# Patient Record
Sex: FEMALE | Race: WHITE | URBAN - METROPOLITAN AREA
[De-identification: names, ages, dates, MRNs, and addresses within clinical notes are randomized per-mention and may not be internally consistent; named-entity substitution may affect disease eponyms.]

---

## 2018-09-05 ENCOUNTER — HOSPITAL ENCOUNTER (EMERGENCY)
Age: 23
Discharge: HOME | End: 2018-09-05
Attending: EMERGENCY MEDICINE
Payer: COMMERCIAL

## 2018-09-05 VITALS
HEART RATE: 90 BPM | OXYGEN SATURATION: 100 % | HEIGHT: 63 IN | DIASTOLIC BLOOD PRESSURE: 88 MMHG | BODY MASS INDEX: 34.55 KG/M2 | WEIGHT: 195 LBS | SYSTOLIC BLOOD PRESSURE: 108 MMHG | RESPIRATION RATE: 20 BRPM | TEMPERATURE: 98 F

## 2018-09-05 DIAGNOSIS — R82.5 POSITIVE URINE DRUG SCREEN: ICD-10-CM

## 2018-09-05 DIAGNOSIS — R45.851 SUICIDAL IDEATION: Primary | ICD-10-CM

## 2018-09-05 LAB
AMPHETAMINE SCREEN, URINE: ABNORMAL
BARBITURATE SCREEN URINE: ABNORMAL
BENZODIAZEPINE SCREEN, URINE: ABNORMAL
CANNABINOID SCREEN URINE: POSITIVE
COCAINE METABOLITE SCREEN URINE: ABNORMAL
Lab: ABNORMAL
METHADONE SCREEN, URINE: ABNORMAL
OPIATE SCREEN URINE: ABNORMAL
OXYCODONE URINE: ABNORMAL
PH UA: 6
PHENCYCLIDINE SCREEN URINE: ABNORMAL
PROPOXYPHENE SCREEN: ABNORMAL

## 2018-09-05 PROCEDURE — 80307 DRUG TEST PRSMV CHEM ANLYZR: CPT

## 2018-09-05 PROCEDURE — 99284 EMERGENCY DEPT VISIT MOD MDM: CPT

## 2018-09-05 ASSESSMENT — ENCOUNTER SYMPTOMS
SHORTNESS OF BREATH: 0
ABDOMINAL PAIN: 0

## 2018-09-05 NOTE — ED PROVIDER NOTES
SURGICAL HISTORY     History reviewed. No pertinent surgical history. CURRENT MEDICATIONS       There are no discharge medications for this patient. ALLERGIES     Patient has no known allergies. FAMILY HISTORY     History reviewed. No pertinent family history. SOCIAL HISTORY       Social History     Social History    Marital status: Single     Spouse name: N/A    Number of children: N/A    Years of education: N/A     Social History Main Topics    Smoking status: Never Smoker    Smokeless tobacco: Never Used    Alcohol use Yes      Comment: occ, over 1mth ago    Drug use: Yes     Types: Marijuana    Sexual activity: Yes     Partners: Male     Other Topics Concern    None     Social History Narrative    None       SCREENINGS             PHYSICAL EXAM    (up to 7 for level 4, 8 or more for level 5)     ED Triage Vitals [09/05/18 1305]   BP Temp Temp Source Pulse Resp SpO2 Height Weight   108/88 98 °F (36.7 °C) Oral 90 20 100 % 5' 3\" (1.6 m) 195 lb (88.5 kg)       Physical Exam   Constitutional: She is oriented to person, place, and time. She appears well-developed and well-nourished. HENT:   Head: Normocephalic and atraumatic. Neck: Normal range of motion. Cardiovascular: Normal rate. Pulmonary/Chest: Effort normal. No respiratory distress. Abdominal: Soft. She exhibits no distension. Musculoskeletal: Normal range of motion. Neurological: She is alert and oriented to person, place, and time. Skin: Skin is warm and dry. Psychiatric: She exhibits a depressed mood. She expresses suicidal ideation. Patient states she has been having increasing suicidal thoughts over the last year. States she made comments to her mother today that she wanted to hurt herself. Patient had a flat affect but is otherwise alert and oriented and answering questions appropriately.   Patient denies having a plan for suicide and states that she really just wants help       DIAGNOSTIC her to be discharged home. Psychiatry also reports this point the patient is refusing to have blood work drawn. Dr. Pedrito Aviles was notified that the patient is considered medically cleared. Patient did have positive drug screen. Psychiatry was informed that she was not medically cleared by myself Dr. ePdrito Aviles was notified and Dr. Pedrito Aviles discussed the case with psychiatry and was planning on discharging the patient. The patient tolerated their visit well. They were seen and evaluated by the attending physician, No att. providers found who agreed with the assessment and plan. The patient and / or the family were informed of the results of any tests, a time was given to answer questions, a plan was proposed and they agreed with plan. FINAL IMPRESSION      1. Suicidal ideation    2. Positive urine drug screen          DISPOSITION/PLAN   DISPOSITION Decision To Discharge 09/05/2018 07:13:35 PM      PATIENT REFERRED TO:  No follow-up provider specified. DISCHARGE MEDICATIONS:  There are no discharge medications for this patient. DISCONTINUED MEDICATIONS:  There are no discharge medications for this patient.              (Please note that portions of this note were completed with a voice recognition program.  Efforts were made to edit the dictations but occasionally words are mis-transcribed.)    DANIEL Cortes CNP (electronically signed)     DANIEL Cortes CNP  09/05/18 2030

## 2018-09-05 NOTE — ED PROVIDER NOTES
Attending Supervisory Note/Shared Visit   I have personally performed a face to face diagnostic evaluation on this patient. I have reviewed the mid-levels findings and agree. History and Exam by me shows: This is a 61-year-old female brought to emergency department for evaluation after our department with her mother patient reports being usual state of health until this last several months. Does report that she's had increasing anxiety and suicidal thoughts. Does report having history of having ADHD and had been on medication up until she was 20 after which she stopped taking it. She states that she's been feeling more anxious and depressed. She feels that she does need to be seen by a counselor or psychiatrist.  Patient states that she has been up appointment with her primary care physician for follow-up in the short time. She states that today her symptoms were exacerbated by an argument with her mother no longer feels suicidal or homicidal.    Physical exam upon arrival patient's afebrile vital signs noted above in general well-appearing well-nourished female in no acute distress chest regular rhythm no murmurs rubs or gallops abdomen soft nontender nondistended lungs clear to auscultation bilaterally back no cervical thoracic lumps or bony chest is CVA tenderness extremities no unilateral leg swelling or tenderness mind either one of her calves psychiatric patient denies any thoughts of hurting herself or anyone else at this time    Decision-making  This is a 61-year-old female brought to emergency room for evaluation after an appointment with her mother. Based on patient's history and physical there could be multiple causes of patient's symptoms. Would be concerned about possible drug or alcohol abuse.   Patient denying thoughts of hurting herself or he was at this time I do believe that she is a low risk for self injury injuries behavior in the near future    Clinical impression 1 acute stress reaction 2. Suicidal ideation now resolved    Patient was seen and evaluated by psychiatry. They feel the patient is safe to be discharged home. He did not feel any blood work or laboratory work or imaging studies is necessary at this time. As patient vigorously denies any thoughts of hurting herself or anyone else she will be discharged home. Strongly encouraged to follow-up with primary care physician or referral physician next 24-48 hours. Return precautions were discussed with her.      Laura Neely MD  09/05/18 Wellington Aldana MD  09/05/18 [id number]